# Patient Record
Sex: FEMALE | Race: WHITE | NOT HISPANIC OR LATINO | Employment: OTHER | URBAN - METROPOLITAN AREA
[De-identification: names, ages, dates, MRNs, and addresses within clinical notes are randomized per-mention and may not be internally consistent; named-entity substitution may affect disease eponyms.]

---

## 2017-10-10 ENCOUNTER — OFFICE VISIT (OUTPATIENT)
Dept: CARDIOLOGY | Facility: CLINIC | Age: 67
End: 2017-10-10

## 2017-10-10 VITALS
BODY MASS INDEX: 28.16 KG/M2 | DIASTOLIC BLOOD PRESSURE: 96 MMHG | WEIGHT: 169 LBS | SYSTOLIC BLOOD PRESSURE: 124 MMHG | HEART RATE: 63 BPM | HEIGHT: 65 IN

## 2017-10-10 DIAGNOSIS — I10 ESSENTIAL HYPERTENSION: ICD-10-CM

## 2017-10-10 DIAGNOSIS — R07.2 PRECORDIAL PAIN: Primary | ICD-10-CM

## 2017-10-10 DIAGNOSIS — E78.2 MIXED HYPERLIPIDEMIA: ICD-10-CM

## 2017-10-10 PROCEDURE — 99204 OFFICE O/P NEW MOD 45 MIN: CPT | Performed by: INTERNAL MEDICINE

## 2017-10-10 PROCEDURE — 93000 ELECTROCARDIOGRAM COMPLETE: CPT | Performed by: INTERNAL MEDICINE

## 2017-10-10 NOTE — PROGRESS NOTES
Date of Office Visit: 10/10/17  Encounter Provider: Kolby Cotton MD  Place of Service: [facility] CARDIOLOGY  Patient Name: Avril Rubin  :1950      Chief Complaint   Patient presents with   • Chest Pain     History of Present Illness  HPI Comments: Mrs Rubin is a very pleasant 66 yo female with history of bone hypertension, hyperlipidemia, softer reflux hernia with absence for evaluation of chest pain.  She states over the past 3-4 months she's had about 3-4 episodes of chest pain.  They typically occur as a pressure in her chest occurring in the evening not essentially with exertional activity lasting 4-5 minutes and resolving spontaneously.  Today she had an episode a week ago that was a little bit different it started out in her back between her shoulder blades into her chest but the same type of pressure.  She's also noted over this time for that her blood pressures been a little more elevated than she's also had some flushing that would often occur before these episodes she had been exercising and started walking again doing 3 miles 5 days a week and if anything things may be getting better.  She denies any history of rheumatic fever, heart attack, heart failure, heart murmur.  Denies any palpitations, near-syncope or syncope.  She has been under a fair amount of stress at home she's caring for her brother who lost a leg.    Chest Pain    Pertinent negatives include no abdominal pain, back pain, diaphoresis, dizziness, fever, headaches, malaise/fatigue, nausea, numbness, vomiting or weakness.   Pertinent negatives for past medical history include no muscle weakness.         Past Medical History:   Diagnosis Date   • Anemia    • GERD (gastroesophageal reflux disease)    • Hiatal hernia    • High blood pressure    • Wrist fracture, bilateral          Past Surgical History:   Procedure Laterality Date   • BREAST AUGMENTATION     • LAPAROSCOPIC TUBAL LIGATION            Current Outpatient Prescriptions on File Prior to Visit   Medication Sig Dispense Refill   • naproxen sodium (ALEVE) 220 MG tablet Take 220 mg by mouth 2 (Two) Times a Day As Needed.       No current facility-administered medications on file prior to visit.          Social History     Social History   • Marital status: Unknown     Spouse name: N/A   • Number of children: N/A   • Years of education: N/A     Occupational History   • Not on file.     Social History Main Topics   • Smoking status: Never Smoker   • Smokeless tobacco: Not on file   • Alcohol use Yes      Comment: rare social occasions   • Drug use: No   • Sexual activity: Defer     Other Topics Concern   • Not on file     Social History Narrative       Family History   Problem Relation Age of Onset   • Heart disease Other    • Heart attack Other    • Heart disease Father    • Hypertension Father    • Cancer Father    • COPD Father    • Heart disease Brother    • Hypertension Brother          Review of Systems   Constitution: Negative for decreased appetite, diaphoresis, fever, weakness, malaise/fatigue, weight gain and weight loss.   HENT: Negative for congestion, hearing loss, nosebleeds and tinnitus.    Eyes: Negative for blurred vision, double vision, vision loss in left eye, vision loss in right eye and visual disturbance.   Cardiovascular:        As noted in HPI   Respiratory:        As noted HPI   Endocrine: Negative for cold intolerance and heat intolerance.   Hematologic/Lymphatic: Negative for bleeding problem. Does not bruise/bleed easily.   Skin: Negative for color change, flushing, itching and rash.   Musculoskeletal: Negative for arthritis, back pain, joint pain, joint swelling, muscle weakness and myalgias.   Gastrointestinal: Negative for bloating, abdominal pain, constipation, diarrhea, dysphagia, heartburn, hematemesis, hematochezia, melena, nausea and vomiting.   Genitourinary: Negative for bladder incontinence, dysuria,  "frequency, nocturia and urgency.   Neurological: Negative for dizziness, focal weakness, headaches, light-headedness, loss of balance, numbness, paresthesias and vertigo.   Psychiatric/Behavioral: Negative for depression, memory loss and substance abuse.       Procedures      ECG 12 Lead  Date/Time: 10/10/2017 12:55 PM  Performed by: BRANDI BARBA  Authorized by: BRANDI BARBA   Comparison: not compared with previous ECG   Previous ECG: no previous ECG available  Rhythm: sinus rhythm  Rate: normal  QRS axis: normal                 Objective:    /96 (BP Location: Left arm)  Pulse 63  Ht 65\" (165.1 cm)  Wt 169 lb (76.7 kg)  BMI 28.12 kg/m2       Physical Exam  Physical Exam   Constitutional: She is oriented to person, place, and time. She appears well-developed and well-nourished. No distress.   HENT:   Head: Normocephalic.   Eyes: Conjunctivae are normal. Pupils are equal, round, and reactive to light. No scleral icterus.   Neck: Normal carotid pulses, no hepatojugular reflux and no JVD present. Carotid bruit is not present. No tracheal deviation, no edema and no erythema present. No thyromegaly present.   Cardiovascular: Normal rate, regular rhythm, S1 normal, S2 normal, normal heart sounds and intact distal pulses.   No extrasystoles are present. PMI is not displaced.  Exam reveals no gallop, no distant heart sounds and no friction rub.    No murmur heard.  Pulses:       Carotid pulses are 2+ on the right side, and 2+ on the left side.       Radial pulses are 2+ on the right side, and 2+ on the left side.        Femoral pulses are 2+ on the right side, and 2+ on the left side.       Dorsalis pedis pulses are 2+ on the right side, and 2+ on the left side.        Posterior tibial pulses are 2+ on the right side, and 2+ on the left side.   Pulmonary/Chest: Effort normal and breath sounds normal. No respiratory distress. She has no decreased breath sounds. She has no wheezes. She has no rhonchi. She " has no rales. She exhibits no tenderness.   Abdominal: Soft. Bowel sounds are normal. She exhibits no distension and no mass. There is no hepatosplenomegaly. There is no tenderness. There is no rebound and no guarding.   Musculoskeletal: She exhibits no edema, tenderness or deformity.   Neurological: She is alert and oriented to person, place, and time.   Skin: Skin is warm and dry. No rash noted. She is not diaphoretic. No cyanosis or erythema. No pallor. Nails show no clubbing.   Psychiatric: She has a normal mood and affect. Her speech is normal and behavior is normal. Judgment and thought content normal.           Assessment:   1.  67-year-old female with atypical chest pain that I don't believe represents myocardial ischemia unfortunately her coronary disease risk consortium score is 11% which is intermediate risk.  Therefore appropriate use criteria cessation needs further evaluation will do that with perfusion stress testing.  If this is low risk treat her medically if negative rule out noncardiac etiologies if intermediate to high risk consider further evaluation.  Keep you informed of this test results.  2.:  Hypertension I believe she probably has hypertension we'll have her check her blood pressure twice a day at home and call those results.  3.  Probable hyperlipidemia.  Try to obtain copies of her lipids and do an [employer] 10 year risk of stroke or heart attack evaluation make recommendations as far as further to statins.  Gas after reflux and hiatal hernia and subsequently because of her discomfort.          Plan:

## 2017-10-26 ENCOUNTER — HOSPITAL ENCOUNTER (OUTPATIENT)
Dept: CARDIOLOGY | Facility: HOSPITAL | Age: 67
Discharge: HOME | End: 2017-10-26
Attending: INTERNAL MEDICINE | Admitting: INTERNAL MEDICINE

## 2017-10-26 ENCOUNTER — TELEPHONE (OUTPATIENT)
Dept: CARDIOLOGY | Facility: CLINIC | Age: 67
End: 2017-10-26

## 2017-10-26 DIAGNOSIS — R07.2 PRECORDIAL PAIN: ICD-10-CM

## 2017-10-26 DIAGNOSIS — E78.2 MIXED HYPERLIPIDEMIA: ICD-10-CM

## 2017-10-26 DIAGNOSIS — I10 ESSENTIAL HYPERTENSION: ICD-10-CM

## 2017-10-26 LAB
BH CV NUCLEAR PRIOR STUDY: 2
BH CV STRESS BP STAGE 1: NORMAL
BH CV STRESS BP STAGE 2: NORMAL
BH CV STRESS BP STAGE 3: NORMAL
BH CV STRESS DURATION MIN STAGE 1: 3
BH CV STRESS DURATION MIN STAGE 2: 3
BH CV STRESS DURATION MIN STAGE 3: 1
BH CV STRESS DURATION SEC STAGE 1: 0
BH CV STRESS DURATION SEC STAGE 2: 0
BH CV STRESS DURATION SEC STAGE 3: 30
BH CV STRESS GRADE STAGE 1: 10
BH CV STRESS GRADE STAGE 2: 12
BH CV STRESS GRADE STAGE 3: 14
BH CV STRESS HR STAGE 1: 105
BH CV STRESS HR STAGE 2: 132
BH CV STRESS HR STAGE 3: 151
BH CV STRESS METS STAGE 1: 5
BH CV STRESS METS STAGE 2: 7.5
BH CV STRESS METS STAGE 3: 10
BH CV STRESS PROTOCOL 1: NORMAL
BH CV STRESS RECOVERY BP: NORMAL MMHG
BH CV STRESS RECOVERY HR: 88 BPM
BH CV STRESS SPEED STAGE 1: 1.7
BH CV STRESS SPEED STAGE 2: 2.5
BH CV STRESS SPEED STAGE 3: 3.4
BH CV STRESS STAGE 1: 1
BH CV STRESS STAGE 2: 2
BH CV STRESS STAGE 3: 3
LV EF NUC BP: 71 %
MAXIMAL PREDICTED HEART RATE: 154 BPM
PERCENT MAX PREDICTED HR: 98.05 %
STRESS BASELINE BP: NORMAL MMHG
STRESS BASELINE HR: 63 BPM
STRESS PERCENT HR: 115 %
STRESS POST ESTIMATED WORKLOAD: 8 METS
STRESS POST EXERCISE DUR MIN: 7 MIN
STRESS POST EXERCISE DUR SEC: 30 SEC
STRESS POST PEAK BP: NORMAL MMHG
STRESS POST PEAK HR: 151 BPM
STRESS TARGET HR: 131 BPM

## 2017-10-26 PROCEDURE — 78452 HT MUSCLE IMAGE SPECT MULT: CPT

## 2017-10-26 PROCEDURE — 93017 CV STRESS TEST TRACING ONLY: CPT

## 2017-10-26 PROCEDURE — 93018 CV STRESS TEST I&R ONLY: CPT | Performed by: INTERNAL MEDICINE

## 2017-10-26 PROCEDURE — 93016 CV STRESS TEST SUPVJ ONLY: CPT | Performed by: INTERNAL MEDICINE

## 2017-10-26 PROCEDURE — A9502 TC99M TETROFOSMIN: HCPCS | Performed by: INTERNAL MEDICINE

## 2017-10-26 PROCEDURE — 0 TECHNETIUM TETROFOSMIN KIT: Performed by: INTERNAL MEDICINE

## 2017-10-26 PROCEDURE — 78452 HT MUSCLE IMAGE SPECT MULT: CPT | Performed by: INTERNAL MEDICINE

## 2017-10-26 RX ADMIN — TETROFOSMIN 1 DOSE: 1.38 INJECTION, POWDER, LYOPHILIZED, FOR SOLUTION INTRAVENOUS at 08:35

## 2017-10-26 RX ADMIN — TETROFOSMIN 1 DOSE: 1.38 INJECTION, POWDER, LYOPHILIZED, FOR SOLUTION INTRAVENOUS at 09:55

## 2017-10-26 NOTE — TELEPHONE ENCOUNTER
Avril Rubin  Female, 67 y.o., 1950  PCP:   THERESE Villalpando  Language:   English  Need Interp:   No  Last Weight:   169 lb (76.7 kg)  Phone:   H: [phone]  Allergies  No Known Allergies  Health Maintenance:   Due  FYI:   None  Primary Ins.:   MEDICARE  MRN:   [id number]  MyChart:   Pending  Pharmacy:   [id number] [place] [address] AT [address] [phone] [place] [phone] FX [[id number]]  Preferred Lab:   None  Next Appt with Me:   None  Next Appt Date by Dept:   None    PACS Images        Radiology Images         Stress Test With Myocardial Perfusion One Day   Status:  Final result   Visible to patient:  No (Not Released) Dx:  Mixed hyperlipidemia; Precordial pain... Order: [id number]         Details        Reading Physician Reading Date Result Priority       MD Enma Topete MD Rebecca M McFarland, MD 10/26/2017  10/26/2017  10/26/2017 Routine           Result Text             · Findings consistent with a normal ECG stress test.  · Left ventricular ejection fraction is hyperdynamic (Calculated EF > 70%).  · Myocardial perfusion imaging indicates a normal myocardial perfusion study with no evidence of ischemia.  · Impressions are consistent with a low risk study.                    All Measurements          Ref Range & Units 10:30 AM         Nuclear Prior Study  2       BH CV STRESS PROTOCOL 1  Lalo       Stage 1  1       HR Stage 1  105       BP Stage 1  122/70       Duration Min Stage 1  3       Duration Sec Stage 1  0       Grade Stage 1  10       Speed Stage 1  1.7       BH CV STRESS METS STAGE 1  5       Stage 2  2       HR Stage 2  132       BP Stage 2  132/64       Duration Min Stage 2  3       Duration Sec Stage 2  0       Grade Stage 2  12       Speed Stage 2  2.5       BH CV STRESS METS STAGE 2  7.5       Stage 3  3       HR Stage 3  151       BP Stage 3  144/64       Duration Min Stage 3  1       Duration Sec  Stage 3  30       Grade Stage 3  14       Speed Stage 3  3.4       BH CV STRESS METS STAGE 3  10.0       Baseline HR bpm 63       Baseline BP mmHg 128/86       Peak HR bpm 151       Percent Max Pred HR % 98.05       Percent Target HR % 115       Peak BP mmHg 144/64       Recovery HR bpm 88       Recovery BP mmHg 158/70       Target HR (85%) bpm 131       Max. Pred. HR (100%) bpm 154       Exercise duration (min) min 7       Exercise duration (sec) sec 30       Estimated workload METS 8.0       Nuc Stress EF % 71                       [facility] NUC [facility]  [address] [facility]  [address]  [place] [address] [phone]            Stress Data        Stage HR (bpm) BP (mmHg) Minutes Seconds Grade (%) Speed (MPH)       1        105        122/70        3        0        10        1.7              2        132        132/64        3        0        12        2.5              3        151        144/64        1        30        14        3.4                  Stress Measurements        Baseline Vitals   Baseline HR 63 bpm        Baseline /86 mmHg         Peak Stress Vitals   Peak  bpm        Peak /64 mmHg         Recovery Vitals   Recovery HR 88 bpm        Recovery /70 mmHg         Exercise Data   Target HR (85%) 131 bpm        Max. Pred. HR (100%) 154 bpm        Percent Max Pred HR 98.05 %        Exercise duration (min) 7 min        Exercise duration (sec) 30 sec        Estimated workload 8 METS                 Stress Procedure        Rest ECG  Baseline ECG of normal sinus rhythm noted. Normal baseline ECG noted.   MT interval = 160 ms. QRS complex = 80 ms. There was no ST segment deviation noted.       Stress ECG  Stress ECG of normal sinus rhythm noted. Normal ECG with no significant stress induced changes noted.   There was no ST segment deviation noted during stress.   Arrhythmias during stress: rare PVCs.   There were no arrhythmias during recovery.   Arrhythmias were not  significant.   ECG was interpretable and indicates a normal stress ECG.   Normal ECG stress ECG interpretation.       Stress Description  A stress test was performed following the Lalo protocol.   The patient achieved the target heart rate. The test was stopped because the patient complained of fatigue and shortness of breath.  The patient experienced no angina during the stress test.   Low risk for ischemic heart disease.   Blood pressure demonstrated a normal response to stress. Heart rate demonstrated a normal response to stress. Overall, the patient's exercise capacity was normal.       Stress Findings  No ECG evidence of myocardial ischemia.Negative clinical evidence of myocardial ischemia. Findings consistent with a normal ECG stress test. Pt had up sloping ST during exercise.           Nuclear Perfusion Findings        Study Impression  Myocardial perfusion imaging indicates a normal myocardial perfusion study with no evidence of ischemia. Impressions are consistent with a low risk study.       Rest Perfusion Defect 1  No rest myocardial perfusion defect noted.       Stress Perfusion Defect 1  No stress myocardial perfusion defect noted.       Ventricle Size / Description  Left ventricular ejection fraction is hyperdynamic (Calculated EF > 70%). Normal LV cavity size. Normal LV wall motion noted. RV cavity size not well visualized.           Nuclear Perfusion        Resting         The left ventricular perfusion is normal.             Stress         The left ventricular perfusion is normal.                  Perfusion Scores           Score Percentage Abnormal          SRS 0 0.00%        SSS 0 0.00%        SDS 0 0.00%                      PACS Images        Show images for Stress Test With Myocardial Perfusion One Day                 Last Resulted: 10/26/17  3:24 PM                            Routing History        Priority Sent On From To Message Type        10/26/2017  3:25 PM Enma Villalta MD  Kolby Cotton MD Results

## 2023-08-02 ENCOUNTER — TRANSCRIBE ORDERS (OUTPATIENT)
Dept: ADMINISTRATIVE | Facility: HOSPITAL | Age: 73
End: 2023-08-02
Payer: MEDICARE

## 2023-08-02 ENCOUNTER — HOSPITAL ENCOUNTER (OUTPATIENT)
Dept: CARDIOLOGY | Facility: HOSPITAL | Age: 73
Discharge: HOME | End: 2023-08-02
Payer: MEDICARE

## 2023-08-02 DIAGNOSIS — M79.662 PAIN AND SWELLING OF LEFT LOWER LEG: ICD-10-CM

## 2023-08-02 DIAGNOSIS — M79.662 PAIN AND SWELLING OF LEFT LOWER LEG: Primary | ICD-10-CM

## 2023-08-02 DIAGNOSIS — M79.89 PAIN AND SWELLING OF LEFT LOWER LEG: Primary | ICD-10-CM

## 2023-08-02 DIAGNOSIS — M79.89 PAIN AND SWELLING OF LEFT LOWER LEG: ICD-10-CM

## 2023-08-02 LAB
BH CV LOWER VASCULAR LEFT COMMON FEMORAL AUGMENT: NORMAL
BH CV LOWER VASCULAR LEFT COMMON FEMORAL COMPETENT: NORMAL
BH CV LOWER VASCULAR LEFT COMMON FEMORAL COMPRESS: NORMAL
BH CV LOWER VASCULAR LEFT COMMON FEMORAL PHASIC: NORMAL
BH CV LOWER VASCULAR LEFT COMMON FEMORAL SPONT: NORMAL
BH CV LOWER VASCULAR LEFT DISTAL FEMORAL COMPRESS: NORMAL
BH CV LOWER VASCULAR LEFT GASTRONEMIUS COMPRESS: NORMAL
BH CV LOWER VASCULAR LEFT GREATER SAPH AK COMPRESS: NORMAL
BH CV LOWER VASCULAR LEFT GREATER SAPH BK COMPRESS: NORMAL
BH CV LOWER VASCULAR LEFT LESSER SAPH COMPRESS: NORMAL
BH CV LOWER VASCULAR LEFT MID FEMORAL AUGMENT: NORMAL
BH CV LOWER VASCULAR LEFT MID FEMORAL COMPETENT: NORMAL
BH CV LOWER VASCULAR LEFT MID FEMORAL COMPRESS: NORMAL
BH CV LOWER VASCULAR LEFT MID FEMORAL PHASIC: NORMAL
BH CV LOWER VASCULAR LEFT MID FEMORAL SPONT: NORMAL
BH CV LOWER VASCULAR LEFT PERONEAL COMPRESS: NORMAL
BH CV LOWER VASCULAR LEFT POPLITEAL AUGMENT: NORMAL
BH CV LOWER VASCULAR LEFT POPLITEAL COMPETENT: NORMAL
BH CV LOWER VASCULAR LEFT POPLITEAL COMPRESS: NORMAL
BH CV LOWER VASCULAR LEFT POPLITEAL PHASIC: NORMAL
BH CV LOWER VASCULAR LEFT POPLITEAL SPONT: NORMAL
BH CV LOWER VASCULAR LEFT POSTERIOR TIBIAL COMPRESS: NORMAL
BH CV LOWER VASCULAR LEFT PROXIMAL FEMORAL COMPRESS: NORMAL
BH CV LOWER VASCULAR LEFT SAPHENOFEMORAL JUNCTION COMPRESS: NORMAL
BH CV LOWER VASCULAR RIGHT COMMON FEMORAL AUGMENT: NORMAL
BH CV LOWER VASCULAR RIGHT COMMON FEMORAL COMPETENT: NORMAL
BH CV LOWER VASCULAR RIGHT COMMON FEMORAL COMPRESS: NORMAL
BH CV LOWER VASCULAR RIGHT COMMON FEMORAL PHASIC: NORMAL
BH CV LOWER VASCULAR RIGHT COMMON FEMORAL SPONT: NORMAL

## 2023-08-02 PROCEDURE — 93971 EXTREMITY STUDY: CPT

## 2023-11-28 ENCOUNTER — TRANSCRIBE ORDERS (OUTPATIENT)
Dept: ADMINISTRATIVE | Facility: HOSPITAL | Age: 73
End: 2023-11-28
Payer: MEDICARE

## 2023-11-28 DIAGNOSIS — Z98.82 BREAST IMPLANT STATUS: Primary | ICD-10-CM

## 2023-11-28 DIAGNOSIS — Z77.22 EXPOSURE TO SECOND HAND SMOKE: ICD-10-CM

## 2024-08-13 ENCOUNTER — ON CAMPUS - OUTPATIENT (OUTPATIENT)
Dept: URBAN - METROPOLITAN AREA HOSPITAL 77 | Facility: HOSPITAL | Age: 74
End: 2024-08-13
Payer: MEDICARE

## 2024-08-13 DIAGNOSIS — Z12.11 ENCOUNTER FOR SCREENING FOR MALIGNANT NEOPLASM OF COLON: ICD-10-CM

## 2024-08-13 DIAGNOSIS — K64.1 SECOND DEGREE HEMORRHOIDS: ICD-10-CM

## 2024-08-13 DIAGNOSIS — K57.30 DIVERTICULOSIS OF LARGE INTESTINE WITHOUT PERFORATION OR ABS: ICD-10-CM

## 2024-08-13 DIAGNOSIS — D12.8 BENIGN NEOPLASM OF RECTUM: ICD-10-CM

## 2024-08-13 DIAGNOSIS — D12.2 BENIGN NEOPLASM OF ASCENDING COLON: ICD-10-CM

## 2024-08-13 PROCEDURE — 45385 COLONOSCOPY W/LESION REMOVAL: CPT | Mod: PT | Performed by: INTERNAL MEDICINE
